# Patient Record
Sex: FEMALE | Race: WHITE
[De-identification: names, ages, dates, MRNs, and addresses within clinical notes are randomized per-mention and may not be internally consistent; named-entity substitution may affect disease eponyms.]

---

## 2019-02-17 ENCOUNTER — HOSPITAL ENCOUNTER (EMERGENCY)
Dept: HOSPITAL 72 - EMR | Age: 24
Discharge: HOME | End: 2019-02-17
Payer: COMMERCIAL

## 2019-02-17 VITALS — DIASTOLIC BLOOD PRESSURE: 63 MMHG | SYSTOLIC BLOOD PRESSURE: 111 MMHG

## 2019-02-17 VITALS — BODY MASS INDEX: 22.53 KG/M2 | WEIGHT: 132 LBS | HEIGHT: 64 IN

## 2019-02-17 VITALS — SYSTOLIC BLOOD PRESSURE: 95 MMHG | DIASTOLIC BLOOD PRESSURE: 60 MMHG

## 2019-02-17 VITALS — SYSTOLIC BLOOD PRESSURE: 104 MMHG | DIASTOLIC BLOOD PRESSURE: 77 MMHG

## 2019-02-17 DIAGNOSIS — R10.2: Primary | ICD-10-CM

## 2019-02-17 DIAGNOSIS — N39.0: ICD-10-CM

## 2019-02-17 LAB
ADD MANUAL DIFF: NO
ALBUMIN SERPL-MCNC: 4 G/DL (ref 3.4–5)
ALBUMIN/GLOB SERPL: 1.2 {RATIO} (ref 1–2.7)
ALP SERPL-CCNC: 55 U/L (ref 46–116)
ALT SERPL-CCNC: 19 U/L (ref 12–78)
ANION GAP SERPL CALC-SCNC: 11 MMOL/L (ref 5–15)
APPEARANCE UR: (no result)
APTT PPP: YELLOW S
AST SERPL-CCNC: 15 U/L (ref 15–37)
BASOPHILS NFR BLD AUTO: 1.1 % (ref 0–2)
BILIRUB SERPL-MCNC: 0.7 MG/DL (ref 0.2–1)
BUN SERPL-MCNC: 11 MG/DL (ref 7–18)
CALCIUM SERPL-MCNC: 9.2 MG/DL (ref 8.5–10.1)
CHLORIDE SERPL-SCNC: 105 MMOL/L (ref 98–107)
CO2 SERPL-SCNC: 23 MMOL/L (ref 21–32)
CREAT SERPL-MCNC: 0.9 MG/DL (ref 0.55–1.3)
EOSINOPHIL NFR BLD AUTO: 2.3 % (ref 0–3)
ERYTHROCYTE [DISTWIDTH] IN BLOOD BY AUTOMATED COUNT: 10.4 % (ref 11.6–14.8)
GLOBULIN SER-MCNC: 3.3 G/DL
GLUCOSE UR STRIP-MCNC: NEGATIVE MG/DL
HCT VFR BLD CALC: 37.8 % (ref 37–47)
HGB BLD-MCNC: 13.1 G/DL (ref 12–16)
KETONES UR QL STRIP: (no result)
LEUKOCYTE ESTERASE UR QL STRIP: (no result)
LYMPHOCYTES NFR BLD AUTO: 25.3 % (ref 20–45)
MCV RBC AUTO: 91 FL (ref 80–99)
MONOCYTES NFR BLD AUTO: 4.6 % (ref 1–10)
NEUTROPHILS NFR BLD AUTO: 66.7 % (ref 45–75)
NITRITE UR QL STRIP: NEGATIVE
PH UR STRIP: 5 [PH] (ref 4.5–8)
PLATELET # BLD: 157 K/UL (ref 150–450)
POTASSIUM SERPL-SCNC: 3.5 MMOL/L (ref 3.5–5.1)
PROT UR QL STRIP: (no result)
RBC # BLD AUTO: 4.17 M/UL (ref 4.2–5.4)
SODIUM SERPL-SCNC: 139 MMOL/L (ref 136–145)
SP GR UR STRIP: 1.03 (ref 1–1.03)
UROBILINOGEN UR-MCNC: NORMAL MG/DL (ref 0–1)
WBC # BLD AUTO: 5.9 K/UL (ref 4.8–10.8)

## 2019-02-17 PROCEDURE — 96375 TX/PRO/DX INJ NEW DRUG ADDON: CPT

## 2019-02-17 PROCEDURE — 99284 EMERGENCY DEPT VISIT MOD MDM: CPT

## 2019-02-17 PROCEDURE — 36415 COLL VENOUS BLD VENIPUNCTURE: CPT

## 2019-02-17 PROCEDURE — 84702 CHORIONIC GONADOTROPIN TEST: CPT

## 2019-02-17 PROCEDURE — 83690 ASSAY OF LIPASE: CPT

## 2019-02-17 PROCEDURE — 76856 US EXAM PELVIC COMPLETE: CPT

## 2019-02-17 PROCEDURE — 96376 TX/PRO/DX INJ SAME DRUG ADON: CPT

## 2019-02-17 PROCEDURE — 81025 URINE PREGNANCY TEST: CPT

## 2019-02-17 PROCEDURE — 96361 HYDRATE IV INFUSION ADD-ON: CPT

## 2019-02-17 PROCEDURE — 81003 URINALYSIS AUTO W/O SCOPE: CPT

## 2019-02-17 PROCEDURE — 80053 COMPREHEN METABOLIC PANEL: CPT

## 2019-02-17 PROCEDURE — 85025 COMPLETE CBC W/AUTO DIFF WBC: CPT

## 2019-02-17 PROCEDURE — 96374 THER/PROPH/DIAG INJ IV PUSH: CPT

## 2019-02-17 NOTE — NUR
ED Nurse Note:



Pt walked in to ER c/o lower abdominal pain 10/10 and vominited x4 while 
waiting in waiting area which started by 1000 this morning. pt is anxious and 
restless due to pain. pt aao x4, skin clean and intact but pale. no abdominal 
distention noted.

## 2019-02-17 NOTE — NUR
ER DISCHARGE NOTE:

Patient is cleared to be discharged per ERMD, pt is aox4, on room air, with 
stable vital signs. pt was given dc and prescription instructions, pt was able 
to verbalize understanding, pt id band and iv site removed without 
complications. pt is able to ambulate with steady gait. pt took all belongings.

## 2019-02-17 NOTE — EMERGENCY ROOM REPORT
History of Present Illness


General


Chief Complaint:  Abdominal Pain


Source:  Patient





Present Illness


HPI


24-year-old female presents ED for evaluation.  Patient complaining of 

abdominal pain with vomiting.  Started earlier today.  Pain is suprapubic, 10 

out of 10, nonradiating.  Notes nausea and vomiting.  Denies fevers or chills.  

States she's had previous pain in the past.  Was told that she had an ovarian 

cyst.  Denies vaginal bleeding or discharge.  No other aggravating relieving 

factors.  Denies any other associated symptoms


Allergies:  


Coded Allergies:  


     PENICILLINS (Verified  Allergy, Intermediate, SWELLILNG, 12)





Patient History


Past Medical History:  other - ovarian cyst


Past Surgical History:  none


Pertinent Family History:  none


Social History:  Denies: smoking, alcohol use, drug use


Last Menstrual Period:  19


Pregnant Now:  No


:  0


Para:  0


Immunizations:  UTD


Reviewed Nursing Documentation:  PMH: Agreed; PSxH: Agreed





Nursing Documentation-PMH


Past Medical History:  No History, Except For


Hx Cardiac Problems:  No - Ovarian cyst


Hx Gastrointestinal Problems:  Yes - ovarian cyst





Review of Systems


All Other Systems:  negative except mentioned in HPI





Physical Exam





Vital Signs








  Date Time  Temp Pulse Resp B/P (MAP) Pulse Ox O2 Delivery O2 Flow Rate FiO2


 


19 11:25 97.7 50 18 111/63 100 Room Air  








Sp02 EP Interpretation:  reviewed, normal


General Appearance:  no apparent distress, alert, GCS 15, non-toxic


Head:  normocephalic, atraumatic


Eyes:  bilateral eye normal inspection, bilateral eye PERRL


ENT:  hearing grossly normal, normal pharynx, no angioedema, normal voice


Neck:  full range of motion, supple/symm/no masses


Respiratory:  chest non-tender, lungs clear, normal breath sounds, speaking 

full sentences


Cardiovascular #1:  regular rate, rhythm, no edema


Cardiovascular #2:  2+ carotid (R), 2+ carotid (L), 2+ radial (R), 2+ radial (L)

, 2+ dorsalis pedis (R), 2+ dorsalis pedis (L)


Gastrointestinal:  normal bowel sounds, soft, non-distended, no guarding, no 

rebound, tenderness - suprapubic


Rectal:  deferred


Genitourinary:  normal inspection, no CVA tenderness


Musculoskeletal:  back normal, gait/station normal, normal range of motion, non-

tender


Neurologic:  alert, oriented x3, responsive, motor strength/tone normal, 

sensory intact, speech normal


Psychiatric:  judgement/insight normal, memory normal, mood/affect normal, no 

suicidal/homicidal ideation


Reflexes:  3+ bicep (R), 3+ bicep (L), 3+ tricep (R), 3+ tricep (L), 3+ knee (R)

, 3+ knee (L)


Skin:  normal color, no rash, warm/dry, well hydrated


Lymphatic:  no adenopathy





Medical Decision Making


Diagnostic Impression:  


 Primary Impression:  


 Pelvic pain


 Additional Impression:  


 UTI (urinary tract infection)


 Qualified Codes:  N39.0 - Urinary tract infection, site not specified


ER Course


Hospital Course 


24-year-old F presents to ED with lower abdominal pain





Differential diagnosis includes- cystitis, UTI, constipation, ovarian cyst/

torsion





Clinical course


Patient placed on stretcher.  After initial history and physical I ordered labs

, IV fluids, pain meds, Pelvic US





Labs - no leukocytosis, electrolytes ok, LFTs normal, UA + blood + bacteria 


Pelvic US - no free fluid, good flow to both ovaries.  Otherwise unremarkable





On reassessment pain improved.  Discussed findings with patient.  She states 

she is on her period.  Likelihood for kidney stone low at this time.  No flank 

pain.





Patient states that she recently discontinued her birth control after several 

months.  Likely increased pain during her period secondary to prolonged 

contraceptive use





Abdomen is otherwise soft.  No Guarding or rebound.  We discussed option for CT 

but patient declined at this time.





Safe for discharge or close outpatient follow-up.  Given copy of ultrasound 

report.  Patient will follow-up with her OB/GYN





I feel this is a highly complex case requiring extensive working including EKG/

Rhythm strip, Xray/CT/US, Blood/urine lab work, repeat exams while in ED, and 

administration of strong opiates/narcotics for pain control, admission to 

hospital or close patient follow up.  





Diagnosis - pelvic pain, UTI





Stable and discharged to home with Rx Motrin, Tylenol #3, Macrobid.  Followup 

with PMD.  Return to ED if symptoms recur or worsen





Labs








Test


  19


11:30 19


11:50


 


Urine Color Yellow  


 


Urine Appearance


  Slightly


cloudy 


 


 


Urine pH 5 (4.5-8.0)  


 


Urine Specific Gravity


  1.030


(1.005-1.035) 


 


 


Urine Protein 2+ (NEGATIVE)  


 


Urine Glucose (UA)


  Negative


(NEGATIVE) 


 


 


Urine Ketones 2+ (NEGATIVE)  


 


Urine Blood 5+ (NEGATIVE)  


 


Urine Nitrite


  Negative


(NEGATIVE) 


 


 


Urine Bilirubin


  Negative


(NEGATIVE) 


 


 


Urine Urobilinogen


  Normal MG/DL


(0.0-1.0) 


 


 


Urine Leukocyte Esterase 2+ (NEGATIVE)  


 


Urine RBC


  2-4 /HPF (0 -


2) 


 


 


Urine WBC


  5-10 /HPF (0 -


2) 


 


 


Urine Squamous Epithelial


Cells Many /LPF


(NONE/OCC) 


 


 


Urine Bacteria


  Few /HPF


(NONE) 


 


 


Urine Mucus


  Few /LPF


(NONE/OCC) 


 


 


Urine HCG, Qualitative


  Negative


(NEGATIVE) 


 


 


White Blood Count


  


  5.9 K/UL


(4.8-10.8)


 


Red Blood Count


  


  4.17 M/UL


(4.20-5.40)


 


Hemoglobin


  


  13.1 G/DL


(12.0-16.0)


 


Hematocrit


  


  37.8 %


(37.0-47.0)


 


Mean Corpuscular Volume  91 FL (80-99) 


 


Mean Corpuscular Hemoglobin


  


  31.3 PG


(27.0-31.0)


 


Mean Corpuscular Hemoglobin


Concent 


  34.5 G/DL


(32.0-36.0)


 


Red Cell Distribution Width


  


  10.4 %


(11.6-14.8)


 


Platelet Count


  


  157 K/UL


(150-450)


 


Mean Platelet Volume


  


  6.5 FL


(6.5-10.1)


 


Neutrophils (%) (Auto)


  


  66.7 %


(45.0-75.0)


 


Lymphocytes (%) (Auto)


  


  25.3 %


(20.0-45.0)


 


Monocytes (%) (Auto)


  


  4.6 %


(1.0-10.0)


 


Eosinophils (%) (Auto)


  


  2.3 %


(0.0-3.0)


 


Basophils (%) (Auto)


  


  1.1 %


(0.0-2.0)


 


Sodium Level


  


  139 MMOL/L


(136-145)


 


Potassium Level


  


  3.5 MMOL/L


(3.5-5.1)


 


Chloride Level


  


  105 MMOL/L


()


 


Carbon Dioxide Level


  


  23 MMOL/L


(21-32)


 


Anion Gap


  


  11 mmol/L


(5-15)


 


Blood Urea Nitrogen


  


  11 mg/dL


(7-18)


 


Creatinine


  


  0.9 MG/DL


(0.55-1.30)


 


Estimat Glomerular Filtration


Rate 


  > 60 mL/min


(>60)


 


Glucose Level


  


  140 MG/DL


()


 


Calcium Level


  


  9.2 MG/DL


(8.5-10.1)


 


Total Bilirubin


  


  0.7 MG/DL


(0.2-1.0)


 


Aspartate Amino Transf


(AST/SGOT) 


  15 U/L (15-37) 


 


 


Alanine Aminotransferase


(ALT/SGPT) 


  19 U/L (12-78) 


 


 


Alkaline Phosphatase


  


  55 U/L


()


 


Total Protein


  


  7.3 G/DL


(6.4-8.2)


 


Albumin


  


  4.0 G/DL


(3.4-5.0)


 


Globulin  3.3 g/dL 


 


Albumin/Globulin Ratio  1.2 (1.0-2.7) 


 


Lipase


  


  127 U/L


()


 


Human Chorionic Gonadotropin,


Quant 


  < 1 mIU/mL


(1-6)








CT/MRI/US Diagnostic Results


CT/MRI/US Diagnostic Results :  


   Imaging Test Ordered:  Pelvic US


   Impression


no acute process. good flow to both ovaries. no signs of ovarian cysts.





Last Vital Signs








  Date Time  Temp Pulse Resp B/P (MAP) Pulse Ox O2 Delivery O2 Flow Rate FiO2


 


19 12:23 97.7       


 


19 11:39  50 18   Room Air  


 


19 11:39    111/63 100   








Status:  improved


Disposition:  HOME, SELF-CARE


Condition:  Stable


Scripts


Nitrofurantoin Monohyd/M-Cryst* (MACROBID 100 MG*) 100 Mg Capsule


100 MG ORAL EVERY 12 HOURS for 7 Days, CAP


   Prov: Jesús Galeas MD         19 


Acetaminophen With Codeine (T#3) (TYLENOL #3 TAB*) Y Tab


1 TAB ORAL Q8H PRN for For Pain for 3 Days, TAB


   Prov: Jesús Galeas MD         19 


Ibuprofen* (MOTRIN*) 600 Mg Tablet


600 MG ORAL Q8H PRN for For Pain, #30 TAB 0 Refills


   Prov: Jesús Galeas MD         19


Referrals:  


NOT CHOSEN [facility]/MD,REFERRING (PCP)











Jesús Galeas MD 2019 13:32

## 2019-02-17 NOTE — DIAGNOSTIC IMAGING REPORT
EXAM:

  US Pelvis Complete, Transabdominal

  US Pelvis, Transvaginal

 

CLINICAL HISTORY:

  ABD PAIN

 

TECHNIQUE:

  Real-time transabdominal and transvaginal pelvic ultrasound (complete) 

with image documentation.  Transvaginal imaging was used for better 

evaluation of the endometrium and adnexa.

 

COMPARISON:

  No relevant prior studies available.

 

FINDINGS:

  Uterus/cervix:  Uterus measures 6.3 x 3.2 x 2.8 cm.  Endometrial stripe 

thickness of 3.2 mm.  No myometrial mass.

  Right ovary:  Right ovary measures 2.6 x 2.0 x 1.6 cm.  Normal Doppler 

blood flow.

  Left ovary:  Left ovary measures 3.5 x 3.2 x 2.6 cm.  Normal Doppler 

blood flow.

  Free fluid:  No free fluid.

  Bladder:  Unremarkable as visualized.  Wall is normal thickness for 

degree of distention.

 

IMPRESSION:     

  No acute findings.